# Patient Record
Sex: FEMALE | Race: WHITE | NOT HISPANIC OR LATINO
[De-identification: names, ages, dates, MRNs, and addresses within clinical notes are randomized per-mention and may not be internally consistent; named-entity substitution may affect disease eponyms.]

---

## 2018-07-10 ENCOUNTER — TRANSCRIPTION ENCOUNTER (OUTPATIENT)
Age: 48
End: 2018-07-10

## 2018-12-14 ENCOUNTER — TRANSCRIPTION ENCOUNTER (OUTPATIENT)
Age: 48
End: 2018-12-14

## 2024-03-12 ENCOUNTER — EMERGENCY (EMERGENCY)
Facility: HOSPITAL | Age: 54
LOS: 0 days | Discharge: HOME | End: 2024-03-12
Attending: STUDENT IN AN ORGANIZED HEALTH CARE EDUCATION/TRAINING PROGRAM
Payer: COMMERCIAL

## 2024-03-12 VITALS
OXYGEN SATURATION: 98 % | SYSTOLIC BLOOD PRESSURE: 139 MMHG | HEART RATE: 74 BPM | RESPIRATION RATE: 17 BRPM | DIASTOLIC BLOOD PRESSURE: 83 MMHG | TEMPERATURE: 98 F
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
## 2024-03-12 DIAGNOSIS — F17.200 NICOTINE DEPENDENCE, UNSPECIFIED, UNCOMPLICATED: ICD-10-CM

## 2024-03-12 DIAGNOSIS — M79.662 PAIN IN LEFT LOWER LEG: ICD-10-CM
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
## 2024-03-12 PROCEDURE — 29515 APPLICATION SHORT LEG SPLINT: CPT | Mod: LT

## 2024-03-12 PROCEDURE — 99282 EMERGENCY DEPT VISIT SF MDM: CPT | Mod: 25

## 2024-03-12 PROCEDURE — 29505 APPLICATION LONG LEG SPLINT: CPT | Mod: LT

## 2024-03-12 PROCEDURE — 99284 EMERGENCY DEPT VISIT MOD MDM: CPT | Mod: 25

## 2024-03-12 NOTE — ED PROVIDER NOTE - NSPTACCESSSVCSAPPTDETAILS_ED_ALL_ED_FT
Please arrange orthopedic follow-up within 1 week, placed in splint in ED, for suspected gastrocnemius tear

## 2024-03-12 NOTE — ED PROVIDER NOTE - PHYSICAL EXAMINATION
CONSTITUTIONAL: Well-developed; well-nourished; NAD  SKIN: warm, dry, w/o rash  HEAD: NCAT  EYES: PERRLA, EOMI, no conjunctival injection  ENT: No nasal discharge; nl OP without erythema or exudates  NECK: Supple, non-tender  CARD: nl S1, S2; RRR, no MRG, no JVD  RESP: CTAB, normal respiratory effort  ABD: BS+, soft, NTND, no HSM  EXT: Normal ROM.  No clubbing, cyanosis or edema; Tenderness to palpation over left calf, no tenderness palpation over Achilles, Achilles appears intact, no ecchymosis, skin changes, nl peripheral pulses  NEURO: Alert, oriented, grossly unremarkable  PSYCH: Cooperative, appropriate

## 2024-03-12 NOTE — ED PROCEDURE NOTE - ATTENDING CONTRIBUTION TO CARE
I was present for and supervised the key and critical aspects of the procedures performed during the care of the patient. Statement Selected

## 2024-03-12 NOTE — ED PROVIDER NOTE - PATIENT PORTAL LINK FT
You can access the FollowMyHealth Patient Portal offered by [facility] by registering at the following website: [email or web address]. By joining [facility]’s FollowMyHealth portal, you will also be able to view your health information using other applications (apps) compatible with our system.

## 2024-03-12 NOTE — ED PROVIDER NOTE - NSFOLLOWUPINSTRUCTIONS_ED_ALL_ED_FT
Our Emergency Department Referral Coordinators will be reaching out to you in the next 24-48 hours from 9:00am to 5:00pm with a follow up appointment. Please expect a phone call from the hospital in that time frame. If you do not receive a call or if you have any questions or concerns, you can reach them at   [phone]      A medial gastrocnemius strain (MGS) is a specific type of injury to the calf muscle in the back of the leg. A muscle strain occurs when the muscle is stretched too far, which causes tears to occur within the muscle. A calf strain occurs when the muscle in the back of the leg sustains this type of injury.      The Calf Muscles  The calf is made of three major muscles, the medial and lateral gastrocnemius and the soleus muscle. They unite to form the Achilles tendon to attach to the heel. The most commonly injured muscle when a calf strain occurs is an injury to the medial gastrocnemius muscle.    It is important to determine whether the gastrocnemius is injured or the soleus in order to give the appropriate treatment and prevent recurrence.    Causes  Calf injuries are commonly seen in sports such as running, soccer, and tennis, and they can even be called, "tennis leg." The gastrocnemius muscle crosses the knee joint and the ankle joint, flexing the foot and the leg. Sudden changes in direction when running can overstretch the muscle and lead to a tear, especially in the position where you are flexing the ankle and extending the knee at the same time. Injuries can also happen in everyday life in physically demanding activities.      Signs and Symptoms  The symptoms of a medial gastrocnemius strain include:      Pain in the back of the leg (more on the inner side)  Swelling of the calf  Bruising of the calf down to the ankle  May hear an audible pop at the moment of the injury    Diagnosis  Your doctor will listen to your report of the injury and do a physical examination. Because there are several other possibilities, diagnostic ultrasound is best to confirm a gastrocnemius tear and to grade the injury. If a deep venous thrombosis is suspected, a Doppler ultrasound may be done. MRI might also be used.    Treatment  Treatment of a medial gastrocnemius strain can usually be accomplished with simple steps. Initially, patients begin with typical "RICE" treatments of:    Rest: Avoiding activities that cause pain. Sometimes people will use crutches for a few days or a week to allow the most significant symptoms of pain to settle down.  Ice: Icing the calf muscle can help with pain, reduce swelling, and ease the inflammation. Ice is one of the most helpful treatments in the early phase of treatment.  Compression: Compression can help to control swelling and can also help support the muscle and reduce spasm. A simple compression sock or sleeve can help.  Elevation: Elevation is also beneficial to help swelling move out of the leg.  It's best not to use heat or massage in the first phase of therapy as that might increase the risk of hemorrhage.    Once the earliest phases of treatment have completed, patients can begin therapeutic activities and gentle stretching. Friction massage may help decrease adhesions.    Typical recovery from a medial gastrocnemius strain is six weeks until return to athletic activities. You must be able to walk without pain before you are ready to return to exercise and sports.

## 2024-03-12 NOTE — ED ADULT NURSE NOTE - NSFALLUNIVINTERV_ED_ALL_ED
Bed/Stretcher in lowest position, wheels locked, appropriate side rails in place/Call bell, personal items and telephone in reach/Instruct patient to call for assistance before getting out of bed/chair/stretcher/Non-slip footwear applied when patient is off stretcher/[place] to call system/Physically safe environment - no spills, clutter or unnecessary equipment/Purposeful proactive rounding/Room/bathroom lighting operational, light cord in reach

## 2024-03-12 NOTE — ED PROVIDER NOTE - CLINICAL SUMMARY MEDICAL DECISION MAKING FREE TEXT BOX
pt with calf pain after feeling a pop while walking, no abx use, will DC with splint and orthopedic follow up.

## 2024-03-12 NOTE — ED PROVIDER NOTE - OBJECTIVE STATEMENT
Patient is a 53-year-old female with no past medical history, current smoker, presenting with left calf pain.  Yesterday patient was walking, felt a pop in her left calf, sustained immediate pain, was unable to walk with leg.  Pain has persisted since that time.  Denies pain in knee, ankle.  Denies numbness.  Denies fever.  Patient otherwise well

## 2024-03-12 NOTE — ED ADULT TRIAGE NOTE - BP NONINVASIVE SYSTOLIC (MM HG)
139 Libtayo Counseling- I discussed with the patient the risks of Libtayo including but not limited to nausea, vomiting, diarrhea, and bone or muscle pain.  The patient verbalized understanding of the proper use and possible adverse effects of Libtayo.  All of the patient's questions and concerns were addressed.

## 2024-03-13 ENCOUNTER — NON-APPOINTMENT (OUTPATIENT)
Age: 54
End: 2024-03-13

## 2024-03-13 ENCOUNTER — APPOINTMENT (OUTPATIENT)
Dept: ORTHOPEDIC SURGERY | Facility: CLINIC | Age: 54
End: 2024-03-13
Payer: COMMERCIAL

## 2024-03-13 ENCOUNTER — RESULT CHARGE (OUTPATIENT)
Age: 54
End: 2024-03-13

## 2024-03-13 VITALS — WEIGHT: 180 LBS | BODY MASS INDEX: 29.99 KG/M2 | HEIGHT: 65 IN

## 2024-03-13 DIAGNOSIS — Z78.9 OTHER SPECIFIED HEALTH STATUS: ICD-10-CM

## 2024-03-13 PROBLEM — Z00.00 ENCOUNTER FOR PREVENTIVE HEALTH EXAMINATION: Status: ACTIVE | Noted: 2024-03-13

## 2024-03-13 PROCEDURE — 73590 X-RAY EXAM OF LOWER LEG: CPT | Mod: LT

## 2024-03-13 PROCEDURE — 99203 OFFICE O/P NEW LOW 30 MIN: CPT

## 2024-03-13 RX ORDER — NABUMETONE 750 MG/1
750 TABLET, FILM COATED ORAL
Qty: 60 | Refills: 0 | Status: ACTIVE | COMMUNITY
Start: 2024-03-13 | End: 1900-01-01

## 2024-03-13 NOTE — PHYSICAL EXAM
[Left] : left foot and ankle [Moderate] : moderate swelling of calf [5___] : [facility] 5[unfilled]/5 [2+] : posterior tibialis pulse: 2+ [] : ambulation with crutches

## 2024-03-13 NOTE — DISCUSSION/SUMMARY
[de-identified] : Patient's hospital splint was removed in office today.  Left calf was Ace wrapped instead for support/stability.  She may weight-bear as tolerated using crutches if needed.  The patient was advised to rest/ice the area and may alternate with warm compresses as needed.    Gentle ROM exercises and Epson salt and warm water was encouraged.  Explained to the patient that this may take 4 to 6 weeks to fully heal and that the first 2 weeks are usually the worst.  The foot/ankle conditioning program from the [facility] was given to the patient so they may try that at home.  Nabumetone 750 mg Rx sent to her pharmacy to help alleviate her symptoms.  The patient will follow-up in 6 weeks for further evaluation.  At that point, if the patient is still in pain, may consider further imaging studies.  All of the patient's questions/concerns were answered in detail.

## 2024-03-13 NOTE — HISTORY OF PRESENT ILLNESS
[de-identified] : Patient is 53-year-old female accompanied by her son and daughter who reports to the office for evaluation of her left calf pain since 3/11/2024.  She states that she was walking when she felt a popping sensation and sharp pain in her left calf.  She went to [facility] where they placed the patient in a posterior splint and provided patient with crutches.  Walking, range of motion, palpating certain areas of the calf aggravate the patient's pain.  Admits to calf swelling.  Denies any numbness or tingling.

## 2024-03-13 NOTE — IMAGING
[de-identified] : Left tib-fib x-rays taken in office today revealed no obvious fractures, subluxations, or dislocations.  No significant abnormalities were seen.

## 2024-03-21 ENCOUNTER — NON-APPOINTMENT (OUTPATIENT)
Age: 54
End: 2024-03-21

## 2024-04-25 ENCOUNTER — APPOINTMENT (OUTPATIENT)
Dept: ORTHOPEDIC SURGERY | Facility: CLINIC | Age: 54
End: 2024-04-25
Payer: COMMERCIAL

## 2024-04-25 ENCOUNTER — NON-APPOINTMENT (OUTPATIENT)
Age: 54
End: 2024-04-25

## 2024-04-25 DIAGNOSIS — S86.812A STRAIN OF OTHER MUSCLE(S) AND TENDON(S) AT LOWER LEG LEVEL, LEFT LEG, INITIAL ENCOUNTER: ICD-10-CM

## 2024-04-25 PROCEDURE — 99213 OFFICE O/P EST LOW 20 MIN: CPT

## 2024-04-25 NOTE — IMAGING
[de-identified] : On examination of the left calf no swelling, no ecchymosis, no erythema.  Skin is intact.  She is able plantarflex, dorsiflex, invert and jonathon with no pain.  Negative Sun's.  No palpable tenderness over the gastrocnemius or Achilles.  She appears to be ambulating well.

## 2024-04-25 NOTE — HISTORY OF PRESENT ILLNESS
[de-identified] : 53-year-old female comes in today for follow-up visit with her son.  Patient had a calf strain that occurred March 11.  She was walking and felt a popping sensation while crossing the street.  She reports a significant improvement since her last visit no complaints today.  She took a few tablets of the nabumetone.  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

## 2024-04-25 NOTE — ASSESSMENT
[FreeTextEntry1] : Resolved calf strain.  He is as tolerated.  Nabumetone as needed.  She will give us a call for follow-up on an as-needed basis.  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

## 2024-12-09 ENCOUNTER — APPOINTMENT (OUTPATIENT)
Dept: ORTHOPEDIC SURGERY | Facility: CLINIC | Age: 54
End: 2024-12-09
Payer: COMMERCIAL

## 2024-12-09 DIAGNOSIS — M25.561 PAIN IN RIGHT KNEE: ICD-10-CM

## 2024-12-09 PROCEDURE — 73562 X-RAY EXAM OF KNEE 3: CPT | Mod: RT

## 2024-12-09 PROCEDURE — 99213 OFFICE O/P EST LOW 20 MIN: CPT

## 2024-12-09 RX ORDER — NABUMETONE 750 MG/1
750 TABLET, FILM COATED ORAL TWICE DAILY
Qty: 60 | Refills: 0 | Status: ACTIVE | COMMUNITY
Start: 2024-12-09 | End: 1900-01-01

## 2025-01-23 ENCOUNTER — APPOINTMENT (OUTPATIENT)
Dept: ORTHOPEDIC SURGERY | Facility: CLINIC | Age: 55
End: 2025-01-23
Payer: COMMERCIAL

## 2025-01-23 DIAGNOSIS — M25.561 PAIN IN RIGHT KNEE: ICD-10-CM

## 2025-01-23 PROCEDURE — 99213 OFFICE O/P EST LOW 20 MIN: CPT
